# Patient Record
Sex: FEMALE | Race: WHITE | NOT HISPANIC OR LATINO | URBAN - METROPOLITAN AREA
[De-identification: names, ages, dates, MRNs, and addresses within clinical notes are randomized per-mention and may not be internally consistent; named-entity substitution may affect disease eponyms.]

---

## 2024-04-11 ENCOUNTER — APPOINTMENT (OUTPATIENT)
Dept: OBSTETRICS AND GYNECOLOGY | Facility: CLINIC | Age: 49
End: 2024-04-11
Payer: COMMERCIAL

## 2025-07-03 ENCOUNTER — OFFICE VISIT (OUTPATIENT)
Dept: OBSTETRICS AND GYNECOLOGY | Facility: CLINIC | Age: 50
End: 2025-07-03
Payer: COMMERCIAL

## 2025-07-03 VITALS
DIASTOLIC BLOOD PRESSURE: 62 MMHG | SYSTOLIC BLOOD PRESSURE: 99 MMHG | BODY MASS INDEX: 28.19 KG/M2 | WEIGHT: 169.2 LBS | HEIGHT: 65 IN

## 2025-07-03 DIAGNOSIS — Z12.31 ENCOUNTER FOR SCREENING MAMMOGRAM FOR MALIGNANT NEOPLASM OF BREAST: ICD-10-CM

## 2025-07-03 DIAGNOSIS — Z11.51 SCREENING FOR HUMAN PAPILLOMAVIRUS (HPV): ICD-10-CM

## 2025-07-03 DIAGNOSIS — N89.8 VAGINAL ODOR: ICD-10-CM

## 2025-07-03 DIAGNOSIS — Z01.419 ENCOUNTER FOR ANNUAL ROUTINE GYNECOLOGICAL EXAMINATION: Primary | ICD-10-CM

## 2025-07-03 PROCEDURE — 3008F BODY MASS INDEX DOCD: CPT

## 2025-07-03 PROCEDURE — 1036F TOBACCO NON-USER: CPT

## 2025-07-03 PROCEDURE — 99386 PREV VISIT NEW AGE 40-64: CPT

## 2025-07-03 RX ORDER — METRONIDAZOLE 7.5 MG/G
GEL VAGINAL DAILY
Qty: 70 G | Refills: 0 | Status: SHIPPED | OUTPATIENT
Start: 2025-07-03 | End: 2025-07-08

## 2025-07-03 ASSESSMENT — ENCOUNTER SYMPTOMS
COUGH: 0
DEPRESSION: 0
FEVER: 0
COLOR CHANGE: 0
DIZZINESS: 0
UNEXPECTED WEIGHT CHANGE: 0
LOSS OF SENSATION IN FEET: 0
DYSURIA: 0
OCCASIONAL FEELINGS OF UNSTEADINESS: 0
SHORTNESS OF BREATH: 0
NAUSEA: 0
FATIGUE: 0
CHILLS: 0
VOMITING: 0
ABDOMINAL PAIN: 0
HEADACHES: 0

## 2025-07-03 ASSESSMENT — LIFESTYLE VARIABLES
HOW MANY STANDARD DRINKS CONTAINING ALCOHOL DO YOU HAVE ON A TYPICAL DAY: PATIENT DOES NOT DRINK
HOW OFTEN DO YOU HAVE A DRINK CONTAINING ALCOHOL: NEVER
HOW OFTEN DO YOU HAVE SIX OR MORE DRINKS ON ONE OCCASION: NEVER
SKIP TO QUESTIONS 9-10: 1
AUDIT-C TOTAL SCORE: 0

## 2025-07-03 ASSESSMENT — PATIENT HEALTH QUESTIONNAIRE - PHQ9
SUM OF ALL RESPONSES TO PHQ9 QUESTIONS 1 & 2: 0
2. FEELING DOWN, DEPRESSED OR HOPELESS: NOT AT ALL
1. LITTLE INTEREST OR PLEASURE IN DOING THINGS: NOT AT ALL

## 2025-07-03 ASSESSMENT — PAIN SCALES - GENERAL: PAINLEVEL_OUTOF10: 0-NO PAIN

## 2025-07-03 NOTE — PROGRESS NOTES
"Subjective   Cindy Cárdenas is a 50 y.o. female who is here for a routine GYN exam. Last saw Dr. Curtis 2022.   - Mirena IUD inserted 2018; effective until 2026. Absent or light spotting occasionally; denies pelvic pain.  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  - Does complain of a persistent yellow vaginal discharge with odor; denies any itching or burning.  Last partner was about 7 months ago.      Complaints:   see hpi  Periods: Absent/irregular with IUD  Dysmenorrhea:  none    Current contraception: Mirena IUD   History of abnormal Pap smear: no  History of abnormal mammogram: no      OB History          5    Para   3    Term   2       1    AB   2    Living   3         SAB   1    IAB        Ectopic        Multiple        Live Births   3                  Review of Systems   Constitutional:  Negative for chills, fatigue, fever and unexpected weight change.   Respiratory:  Negative for cough and shortness of breath.    Gastrointestinal:  Negative for abdominal pain, nausea and vomiting.   Genitourinary:  Positive for vaginal discharge (With odor). Negative for dyspareunia, dysuria and pelvic pain.   Skin:  Negative for color change and rash.   Neurological:  Negative for dizziness and headaches.       Objective   BP 99/62   Ht 1.651 m (5' 5\")   Wt 76.7 kg (169 lb 3.2 oz)   BMI 28.16 kg/m²        General:   Alert and oriented, in no acute distress   Neck: Supple. No visible thyromegaly.    Breast/Axilla: Normal to palpation bilaterally without masses, skin changes, or nipple discharge.    Abdomen: Soft, non-tender, without masses or organomegaly   Vulva: Normal architecture without erythema, masses, or lesions.    Vagina: Normal mucosa without lesions, masses, or atrophy. Moderate amt thinner yellow discharge throughout canal and at introitus; odor present; swab " obtained    Cervix: Normal without masses, lesions, or signs of cervicitis; pap performed with STI testing; iud strings visible from os; scant bleeding with pap; no CMT    Uterus: Normal, mobile, non-enlarged uterus; non-tender   Adnexa: Normal without masses or lesions; non-tender   Pelvic Floor Normal    Psych Normal affect. Normal mood.      Assessment/Plan   Diagnoses and all orders for this visit:  Encounter for annual routine gynecological examination  -     THINPREP PAP TEST  Screening for human papillomavirus (HPV)  -     THINPREP PAP TEST  Encounter for screening mammogram for malignant neoplasm of breast  -     BI mammo bilateral screening tomosynthesis; Future  - Plan to pursue screening mammogram at this time.  Per patient, breast pain appears more intermittent and sporadic.  She will start to monitor and track if it carries a cyclical pattern with her IUD.  Vaginal odor  -     THINPREP PAP TEST  -     Vaginitis Gram Stain For Bacterial Vaginosis + Yeast  - Will rule out any STDs or yeast/BV.   -     metroNIDAZOLE (Metrogel) 0.75 % (37.5mg/5 gram) vaginal gel; Insert into the vagina once daily for 5 days.    Ching Henderson PA-C

## 2025-07-04 LAB — BV SCORE VAG QL: NORMAL

## 2025-07-08 DIAGNOSIS — Z20.2 TRICHOMONAS CONTACT: Primary | ICD-10-CM

## 2025-07-08 LAB
C TRACH RRNA SPEC QL NAA+PROBE: NEGATIVE
N GONORRHOEA DNA SPEC QL PROBE+SIG AMP: NEGATIVE
T VAGINALIS RRNA SPEC QL NAA+PROBE: POSITIVE

## 2025-07-08 RX ORDER — METRONIDAZOLE 500 MG/1
500 TABLET ORAL 2 TIMES DAILY
Qty: 14 TABLET | Refills: 0 | Status: SHIPPED | OUTPATIENT
Start: 2025-07-08 | End: 2025-07-15

## 2025-07-22 LAB
CYTOLOGY CMNT CVX/VAG CYTO-IMP: NORMAL
HPV HR 12 DNA GENITAL QL NAA+PROBE: NEGATIVE
HPV HR GENOTYPES PNL CVX NAA+PROBE: NEGATIVE
HPV16 DNA SPEC QL NAA+PROBE: NEGATIVE
HPV18 DNA SPEC QL NAA+PROBE: NEGATIVE
LAB AP CONTRACEPTIVE HISTORY: NORMAL
LAB AP HPV GENOTYPE QUESTION: YES
LAB AP HPV HR: NORMAL
LAB AP PAP ADDITIONAL TESTS: NORMAL
LABORATORY COMMENT REPORT: NORMAL
PATH REPORT.TOTAL CANCER: NORMAL

## 2025-08-21 ENCOUNTER — OFFICE VISIT (OUTPATIENT)
Dept: OBSTETRICS AND GYNECOLOGY | Facility: CLINIC | Age: 50
End: 2025-08-21
Payer: COMMERCIAL

## 2025-08-21 VITALS
HEIGHT: 65 IN | DIASTOLIC BLOOD PRESSURE: 68 MMHG | WEIGHT: 170 LBS | SYSTOLIC BLOOD PRESSURE: 99 MMHG | BODY MASS INDEX: 28.32 KG/M2

## 2025-08-21 DIAGNOSIS — Z20.2 TRICHOMONAS CONTACT, TREATED: Primary | ICD-10-CM

## 2025-08-21 PROCEDURE — 1036F TOBACCO NON-USER: CPT

## 2025-08-21 PROCEDURE — 99213 OFFICE O/P EST LOW 20 MIN: CPT

## 2025-08-21 PROCEDURE — 3008F BODY MASS INDEX DOCD: CPT

## 2025-08-21 ASSESSMENT — ENCOUNTER SYMPTOMS
SHORTNESS OF BREATH: 0
FEVER: 0
ABDOMINAL PAIN: 0
VOMITING: 0
NAUSEA: 0
FATIGUE: 0
UNEXPECTED WEIGHT CHANGE: 0
DYSURIA: 0

## 2025-08-21 ASSESSMENT — LIFESTYLE VARIABLES
HOW OFTEN DO YOU HAVE SIX OR MORE DRINKS ON ONE OCCASION: NEVER
HOW OFTEN DO YOU HAVE A DRINK CONTAINING ALCOHOL: NEVER
HOW MANY STANDARD DRINKS CONTAINING ALCOHOL DO YOU HAVE ON A TYPICAL DAY: PATIENT DOES NOT DRINK
AUDIT-C TOTAL SCORE: 0
SKIP TO QUESTIONS 9-10: 1

## 2025-08-21 ASSESSMENT — PAIN SCALES - GENERAL: PAINLEVEL_OUTOF10: 0-NO PAIN

## 2025-08-22 LAB — T VAGINALIS RRNA SPEC QL NAA+PROBE: NOT DETECTED
